# Patient Record
Sex: FEMALE | Race: WHITE | NOT HISPANIC OR LATINO | Employment: UNEMPLOYED | URBAN - METROPOLITAN AREA
[De-identification: names, ages, dates, MRNs, and addresses within clinical notes are randomized per-mention and may not be internally consistent; named-entity substitution may affect disease eponyms.]

---

## 2017-01-11 DIAGNOSIS — Z30.09 FAMILY PLANNING: ICD-10-CM

## 2017-01-11 RX ORDER — NORGESTIMATE AND ETHINYL ESTRADIOL 0.25-0.035
1 KIT ORAL DAILY
Qty: 28 TAB | Refills: 0 | Status: SHIPPED | OUTPATIENT
Start: 2017-01-11 | End: 2017-02-13

## 2017-01-11 NOTE — TELEPHONE ENCOUNTER
Was the patient seen in the last year in this department? Yes     Does patient have an active prescription for medications requested? No     Received Request Via: Patient     Patient rescheduled due to provider being out of office.

## 2017-01-16 ENCOUNTER — APPOINTMENT (OUTPATIENT)
Dept: MEDICAL GROUP | Age: 22
End: 2017-01-16
Payer: COMMERCIAL

## 2017-02-13 ENCOUNTER — HOSPITAL ENCOUNTER (OUTPATIENT)
Facility: MEDICAL CENTER | Age: 22
End: 2017-02-13
Attending: FAMILY MEDICINE
Payer: COMMERCIAL

## 2017-02-13 ENCOUNTER — OFFICE VISIT (OUTPATIENT)
Dept: MEDICAL GROUP | Age: 22
End: 2017-02-13
Payer: COMMERCIAL

## 2017-02-13 VITALS
SYSTOLIC BLOOD PRESSURE: 124 MMHG | DIASTOLIC BLOOD PRESSURE: 86 MMHG | HEIGHT: 66 IN | BODY MASS INDEX: 34.39 KG/M2 | TEMPERATURE: 97.9 F | OXYGEN SATURATION: 99 % | WEIGHT: 214 LBS | HEART RATE: 123 BPM

## 2017-02-13 DIAGNOSIS — Z11.3 ROUTINE SCREENING FOR STI (SEXUALLY TRANSMITTED INFECTION): ICD-10-CM

## 2017-02-13 DIAGNOSIS — E66.9 OBESITY (BMI 30-39.9): ICD-10-CM

## 2017-02-13 DIAGNOSIS — R11.0 POSTPRANDIAL NAUSEA: ICD-10-CM

## 2017-02-13 DIAGNOSIS — Z01.419 WELL FEMALE EXAM WITH ROUTINE GYNECOLOGICAL EXAM: Primary | ICD-10-CM

## 2017-02-13 DIAGNOSIS — N92.1 MENOMETRORRHAGIA: ICD-10-CM

## 2017-02-13 DIAGNOSIS — Z13.220 SCREENING, LIPID: ICD-10-CM

## 2017-02-13 DIAGNOSIS — Z13.0 SCREENING FOR DEFICIENCY ANEMIA: ICD-10-CM

## 2017-02-13 DIAGNOSIS — Z13.29 SCREENING FOR THYROID DISORDER: ICD-10-CM

## 2017-02-13 PROCEDURE — 87591 N.GONORRHOEAE DNA AMP PROB: CPT

## 2017-02-13 PROCEDURE — 99395 PREV VISIT EST AGE 18-39: CPT | Performed by: FAMILY MEDICINE

## 2017-02-13 PROCEDURE — 87491 CHLMYD TRACH DNA AMP PROBE: CPT

## 2017-02-13 PROCEDURE — 87510 GARDNER VAG DNA DIR PROBE: CPT

## 2017-02-13 PROCEDURE — 87480 CANDIDA DNA DIR PROBE: CPT

## 2017-02-13 PROCEDURE — 87660 TRICHOMONAS VAGIN DIR PROBE: CPT

## 2017-02-13 RX ORDER — ASCORBIC ACID 500 MG
500 TABLET ORAL DAILY
COMMUNITY

## 2017-02-13 RX ORDER — CHLORAL HYDRATE 500 MG
1000 CAPSULE ORAL
COMMUNITY

## 2017-02-13 RX ORDER — CHOLECALCIFEROL (VITAMIN D3) 125 MCG
500 CAPSULE ORAL DAILY
COMMUNITY

## 2017-02-13 ASSESSMENT — PATIENT HEALTH QUESTIONNAIRE - PHQ9: CLINICAL INTERPRETATION OF PHQ2 SCORE: 0

## 2017-02-13 NOTE — PROGRESS NOTES
"SUBJECTIVE: 21 y.o. female for annual routine gynecologic exam  Chief Complaint   Patient presents with   • Gynecologic Exam       Obstetric History       T0      TAB0   SAB0   E0   M0   L0       Last Pap: 2016   History   Sexual Activity   • Sexual Activity:   • Partners: Male     Sexual history: previously sexually active but currently abstaining, 1 partner since last year.   Has tried and failed multiple oral contraception pills: increase her bleeding or cramping or cause her skin to break out. Has decided she prefers condoms for now  H/O Abnormal Pap no  She  reports that she has never smoked. She has never used smokeless tobacco.        Allergies: Buspar and Sulfa drugs     ROS:    Menses every month with 6-7  Days bleeding 1 day  heavy bleeding   Cramping is moderate.   She does not take OTC analgesics for cramping  No significant bloating/fluid retention, pelvic pain, or dyspareunia. No vaginal discharge   No breast tenderness, mass, nipple discharge, changes in size or contour, or abnormal cyclic discomfort.  No urinary tract symptoms, no incontinence, no polydipsia, polyuria,  No abdominal pain, change in bowel habits, black or bloody stools.    No unusual headaches, no visual changes, has occasional menstrual migraines   No prolonged cough. No dyspnea or chest pain on exertion.  No current labile mood, anxiety, libido changes, insomnia/. Tends to have depression in winter - had family members death anniversary.   Chronic cold temperature intolerance. Reports tends to get nauseous and shaky if goes too long without eating or shortly after eating some foods.. No abdominal pain. No polydipsia, polyuria.   She is frustrated that she is gaining weight when she feels diet is \"healthy\" No new/concerning skin lesions, concerns. Acne cleared after stopping OCP    Exercise: not daily - some treadmill and weights/lunges not tracking calories. Feels diet is generally healthy. He eats a lot of " "smoothies  Preventive Care:current    Current medicines (including changes today)  Current Outpatient Prescriptions   Medication Sig Dispense Refill   • vitamin D (CHOLECALCIFEROL) 1000 UNIT Tab Take 1,000 Units by mouth every day.     • Pyridoxine HCl (VITAMIN B-6 PO) Take  by mouth.     • cyanocobalamin (VITAMIN B-12) 500 MCG Tab Take 500 mcg by mouth every day.     • ascorbic acid (ASCORBIC ACID) 500 MG Tab Take 500 mg by mouth every day.     • Calcium Carbonate-Vit D-Min (CALCIUM 1200 PO) Take  by mouth.     • Omega-3 Fatty Acids (FISH OIL) 1000 MG Cap capsule Take 1,000 mg by mouth 3 times a day, with meals.     • NIACIN CR PO Take  by mouth.     • B Complex Vitamins (B COMPLEX 1 PO) Take  by mouth.       No current facility-administered medications for this visit.     She  has a past medical history of CATARACT (11/7/2009); Menometrorrhagia (6/7/2010); Family planning (3/19/2011); Bleeding after intercourse (7/27/2011); and Personal history of seizure disorder.  She  has past surgical history that includes cataract phaco with iol; tonsillectomy and adenoidectomy (11/24/2009); and bunionectomy (7/30/2010).     Family History:   Family History   Problem Relation Age of Onset   • Cancer Other      GGM had breast cancer   • Thyroid Maternal Grandmother      Graves   • Thyroid Paternal Grandmother      Hypothyroid   • Psychiatry Maternal Grandmother      dx bipolar   • Genetic Paternal Grandmother      ALS       OBJECTIVE:   /86 mmHg  Pulse 123  Temp(Src) 36.6 °C (97.9 °F)  Ht 1.676 m (5' 5.98\")  Wt 97.07 kg (214 lb)  BMI 34.56 kg/m2  SpO2 99%  LMP 01/27/2017  Breastfeeding? No  Body mass index is 34.56 kg/(m^2).    HEAD AND NECK:  Ears normal.  Throat, oral cavity and tongue normal.  Neck supple. No adenopathy or masses in the neck or supraclavicular regions.  No carotid bruits. No thyromegaly. NEURO: Cranial nerves are normal. DTR's normal and symmetric.  CHEST:  Clear, good air entry, no wheezes or " rales. HEART:  Regular rate and rhythm.  S1 and S2 normal.   No edema or JVD. ABDOMEN:  Soft without tenderness, guarding, mass or organomegaly.  No CVA tenderness or inguinal adenopathy. EXTREMITIES:  Extremities, reflexes and peripheral pulses are normal. SKIN: color normal, vascularity normal, no edema, temperature normal   No rashes or suspicious skin lesions noted.     Breast Exam: Performed with instruction during examination. No axillary lymphadenopathy, no skin changes, no dominant masses. No nipple retraction  Pelvic Exam -  Normal external genitalia with no lesions. Vaginal Mucosa:  normal vaginal mucosa . Cervix with no visible lesions. No cervical motion tenderness. Uterus is normal sized with no masses. No adnexal tenderness or enlargement appreciated. Thin Prep Pap is not obtained, vaginal swab is obtained and specimen(s) sent to lab      <ASSESSMENT and PLAN>  1. Well female exam with routine gynecological exam     2. Menometrorrhagia  CBC WITH DIFFERENTIAL    Has adverse effects with all contraception as to date. Patient will use condoms.   3. Routine screening for STI (sexually transmitted infection)  HIV ANTIBODIES    CHLAMYDIA/GC PCR URINE OR SWAB    VAGINAL PATHOGENS DNA PANEL    We'll treat as indicated based on results   4. Screening for deficiency anemia  CBC WITH DIFFERENTIAL   5. Screening for thyroid disorder  TSH WITH REFLEX TO FT4   6. Screening, lipid  COMP METABOLIC PANEL    LIPID PROFILE   7. Postprandial nausea  INSULIN FASTING    Suspect reactive hypoglycemia. Discussed consistent carbs and higher protein and fiber intake with meals   8. Obesity (BMI 30-39.9)  Patient identified as having weight management issue.  Appropriate orders and counseling given.    Discussed diet, exercise, avoiding high caloric fluid intake       Discussed  breast self exam, family planning choices, adequate intake of calcium and vitamin D, diet and exercise   Follow-up in 1 years for next Gyn exam and 2  years for next Pap.   Next office visit for recheck of chronic medical conditions is due in  1 year unless abnormalities identified on labs

## 2017-02-13 NOTE — MR AVS SNAPSHOT
"nAika Wolfe   2017 9:50 AM   Office Visit   MRN: [id number]    Department:  [address] [place] [address]   Dept Phone:  [phone]    Description:  Female : 1995   Provider:  Roberta Ceron M.D.           Reason for Visit     Gynecologic Exam           Allergies as of 2017     Allergen Noted Reactions    Buspar [Buspirone] 01/15/2016       Shaky and vision change    Sulfa Drugs 2009         You were diagnosed with     Menometrorrhagia   [472885]       Routine screening for STI (sexually transmitted infection)   [801131]       Screening for deficiency anemia   [728061]       Screening for thyroid disorder   [V77.0.ICD-9-CM]       Screening, lipid   [318664]       Postprandial nausea   [117136]         Vital Signs     Blood Pressure Pulse Temperature Height Weight Body Mass Index    124/86 mmHg 123 36.6 °C (97.9 °F) 1.676 m (5' 5.98\") 97.07 kg (214 lb) 34.56 kg/m2    Oxygen Saturation Last Menstrual Period Breastfeeding? Smoking Status          99% 2017 No Never Smoker         Basic Information     Date Of Birth Sex Race Ethnicity Preferred Language    1995 Female White Non- English      Problem List              ICD-10-CM Priority Class Noted - Resolved    History of cataract surgery Z98.49   2009 - Present    Menometrorrhagia N92.1   2010 - Present    Family planning Z30.09   3/19/2011 - Present      Health Maintenance        Date Due Completion Dates    IMM INFLUENZA (1) 2016 ---    PAP SMEAR 1/15/2019 1/15/2016    IMM DTaP/Tdap/Td Vaccine (6 - Td) 2019, 1/10/2000, 1998, 9/10/1997, 1997            Current Immunizations     Dtap Vaccine 1/10/2000, 1998, 9/10/1997, 1997    HIB Vaccine (ACTHIB/HIBERIX) 1997    HPV Quadrivalent Vaccine (GARDASIL) 2012, 2011, 2007    Hepatitis A Vaccine, Ped/Adol 2009, 2005    Hepatitis B Vaccine Non-Recombivax (Ped/Adol) 2005, 2000, 1998   " MMR Vaccine 1/10/2000, 4/29/1997    Meningococcal Conjugate Vaccine MCV4 (Menactra) 9/13/2007    OPV - Historical Data 1/10/2000, 9/30/1998, 9/10/1997, 4/29/1997    Tdap Vaccine 11/11/2009    Varicella Vaccine Live 11/11/2009, 4/29/1997      Below and/or attached are the medications your provider expects you to take. Review all of your home medications and newly ordered medications with your provider and/or pharmacist. Follow medication instructions as directed by your provider and/or pharmacist. Please keep your medication list with you and share with your provider. Update the information when medications are discontinued, doses are changed, or new medications (including over-the-counter products) are added; and carry medication information at all times in the event of emergency situations     Allergies:  BUSPAR - (reactions not documented)     SULFA DRUGS - (reactions not documented)               Medications  Valid as of: February 13, 2017 - 10:38 AM    Generic Name Brand Name Tablet Size Instructions for use    Ascorbic Acid (Tab) ascorbic acid 500 MG Take 500 mg by mouth every day.        B Complex Vitamins   Take  by mouth.        Calcium Carbonate-Vit D-Min   Take  by mouth.        Cholecalciferol (Tab) cholecalciferol 1000 UNIT Take 1,000 Units by mouth every day.        Cyanocobalamin (Tab) VITAMIN B-12 500 MCG Take 500 mcg by mouth every day.        Niacin   Take  by mouth.        Omega-3 Fatty Acids (Cap) fish oil 1000 MG Take 1,000 mg by mouth 3 times a day, with meals.        Pyridoxine HCl   Take  by mouth.        .                 Medicines prescribed today were sent to:     [id number] - [place] - [address]    [address] [facility] [place] [address]    Phone: [phone] Fax: [phone]    Open 24 Hours?: No      Medication refill instructions:       If your prescription bottle indicates you have medication refills left, it is not necessary to call your provider’s office. Please  contact your pharmacy and they will refill your medication.    If your prescription bottle indicates you do not have any refills left, you may request refills at any time through one of the following ways: The online [facility] system (except Urgent Care), by calling your provider’s office, or by asking your pharmacy to contact your provider’s office with a refill request. Medication refills are processed only during regular business hours and may not be available until the next business day. Your provider may request additional information or to have a follow-up visit with you prior to refilling your medication.   *Please Note: Medication refills are assigned a new Rx number when refilled electronically. Your pharmacy may indicate that no refills were authorized even though a new prescription for the same medication is available at the pharmacy. Please request the medicine by name with the pharmacy before contacting your provider for a refill.        Your To Do List     Future Labs/Procedures Complete By Expires    CBC WITH DIFFERENTIAL  As directed 2/14/2018    CHLAMYDIA/GC PCR URINE OR SWAB  As directed 2/14/2018    COMP METABOLIC PANEL  As directed 2/14/2018    HIV ANTIBODIES  As directed 2/14/2018    INSULIN FASTING  As directed 2/13/2018    LIPID PROFILE  As directed 2/14/2018    TSH WITH REFLEX TO FT4  As directed 2/13/2018    VAGINAL PATHOGENS DNA PANEL  As directed 2/14/2018         [facility] Access Code: Activation code not generated  Current [facility] Status: Active

## 2017-02-14 ENCOUNTER — HOSPITAL ENCOUNTER (OUTPATIENT)
Dept: LAB | Facility: MEDICAL CENTER | Age: 22
End: 2017-02-14
Attending: FAMILY MEDICINE
Payer: COMMERCIAL

## 2017-02-14 DIAGNOSIS — Z11.3 ROUTINE SCREENING FOR STI (SEXUALLY TRANSMITTED INFECTION): ICD-10-CM

## 2017-02-14 DIAGNOSIS — N92.1 MENOMETRORRHAGIA: ICD-10-CM

## 2017-02-14 DIAGNOSIS — Z13.0 SCREENING FOR DEFICIENCY ANEMIA: ICD-10-CM

## 2017-02-14 DIAGNOSIS — Z13.220 SCREENING, LIPID: ICD-10-CM

## 2017-02-14 DIAGNOSIS — R11.0 POSTPRANDIAL NAUSEA: ICD-10-CM

## 2017-02-14 DIAGNOSIS — Z13.29 SCREENING FOR THYROID DISORDER: ICD-10-CM

## 2017-02-14 LAB
ALBUMIN SERPL BCP-MCNC: 4.1 G/DL (ref 3.2–4.9)
ALBUMIN/GLOB SERPL: 1.4 G/DL
ALP SERPL-CCNC: 54 U/L (ref 30–99)
ALT SERPL-CCNC: 14 U/L (ref 2–50)
ANION GAP SERPL CALC-SCNC: 6 MMOL/L (ref 0–11.9)
AST SERPL-CCNC: 17 U/L (ref 12–45)
BASOPHILS # BLD AUTO: 0.04 K/UL (ref 0–0.12)
BASOPHILS NFR BLD AUTO: 0.6 % (ref 0–1.8)
BILIRUB SERPL-MCNC: 0.5 MG/DL (ref 0.1–1.5)
BUN SERPL-MCNC: 11 MG/DL (ref 8–22)
C TRACH DNA GENITAL QL NAA+PROBE: NEGATIVE
CALCIUM SERPL-MCNC: 9.7 MG/DL (ref 8.5–10.5)
CANDIDA DNA VAG QL PROBE+SIG AMP: NEGATIVE
CHLORIDE SERPL-SCNC: 109 MMOL/L (ref 96–112)
CHOLEST SERPL-MCNC: 145 MG/DL (ref 100–199)
CO2 SERPL-SCNC: 26 MMOL/L (ref 20–33)
CREAT SERPL-MCNC: 0.81 MG/DL (ref 0.5–1.4)
EOSINOPHIL # BLD: 0.1 K/UL (ref 0–0.51)
EOSINOPHIL NFR BLD AUTO: 1.5 % (ref 0–6.9)
ERYTHROCYTE [DISTWIDTH] IN BLOOD BY AUTOMATED COUNT: 36.5 FL (ref 35.9–50)
G VAGINALIS DNA VAG QL PROBE+SIG AMP: NEGATIVE
GLOBULIN SER CALC-MCNC: 2.9 G/DL (ref 1.9–3.5)
GLUCOSE SERPL-MCNC: 84 MG/DL (ref 65–99)
HCT VFR BLD AUTO: 44.2 % (ref 37–47)
HDLC SERPL-MCNC: 64 MG/DL
HGB BLD-MCNC: 14.9 G/DL (ref 12–16)
HIV 1+2 AB+HIV1 P24 AG SERPL QL IA: NON REACTIVE
IMM GRANULOCYTES # BLD AUTO: 0.01 K/UL (ref 0–0.11)
IMM GRANULOCYTES NFR BLD AUTO: 0.1 % (ref 0–0.9)
LDLC SERPL CALC-MCNC: 67 MG/DL
LYMPHOCYTES # BLD: 2.65 K/UL (ref 1–4.8)
LYMPHOCYTES NFR BLD AUTO: 39.7 % (ref 22–41)
MCH RBC QN AUTO: 28.1 PG (ref 27–33)
MCHC RBC AUTO-ENTMCNC: 33.7 G/DL (ref 33.6–35)
MCV RBC AUTO: 83.4 FL (ref 81.4–97.8)
MONOCYTES # BLD: 0.41 K/UL (ref 0–0.85)
MONOCYTES NFR BLD AUTO: 6.1 % (ref 0–13.4)
N GONORRHOEA DNA GENITAL QL NAA+PROBE: NEGATIVE
NEUTROPHILS # BLD: 3.46 K/UL (ref 2–7.15)
NEUTROPHILS NFR BLD AUTO: 52 % (ref 44–72)
NRBC # BLD AUTO: 0 K/UL
NRBC BLD-RTO: 0 /100 WBC
PLATELET # BLD AUTO: 301 K/UL (ref 164–446)
PMV BLD AUTO: 9.6 FL (ref 9–12.9)
POTASSIUM SERPL-SCNC: 4 MMOL/L (ref 3.6–5.5)
PROT SERPL-MCNC: 7 G/DL (ref 6–8.2)
RBC # BLD AUTO: 5.3 M/UL (ref 4.2–5.4)
SODIUM SERPL-SCNC: 141 MMOL/L (ref 135–145)
SPECIMEN SOURCE: NORMAL
T VAGINALIS DNA VAG QL PROBE+SIG AMP: NEGATIVE
TRIGL SERPL-MCNC: 72 MG/DL (ref 0–149)
TSH SERPL DL<=0.005 MIU/L-ACNC: 1.38 UIU/ML (ref 0.3–3.7)
WBC # BLD AUTO: 6.7 K/UL (ref 4.8–10.8)

## 2017-02-14 PROCEDURE — 80053 COMPREHEN METABOLIC PANEL: CPT

## 2017-02-14 PROCEDURE — 85025 COMPLETE CBC W/AUTO DIFF WBC: CPT

## 2017-02-14 PROCEDURE — 80061 LIPID PANEL: CPT

## 2017-02-14 PROCEDURE — 36415 COLL VENOUS BLD VENIPUNCTURE: CPT

## 2017-02-14 PROCEDURE — 87389 HIV-1 AG W/HIV-1&-2 AB AG IA: CPT

## 2017-02-14 PROCEDURE — 83525 ASSAY OF INSULIN: CPT

## 2017-02-14 PROCEDURE — 84443 ASSAY THYROID STIM HORMONE: CPT

## 2017-02-16 LAB — INSULIN P FAST SERPL-ACNC: 15 UIU/ML (ref 3–19)

## 2017-03-02 ENCOUNTER — PATIENT MESSAGE (OUTPATIENT)
Dept: MEDICAL GROUP | Age: 22
End: 2017-03-02

## 2017-05-19 ENCOUNTER — TELEPHONE (OUTPATIENT)
Dept: MEDICAL GROUP | Facility: PHYSICIAN GROUP | Age: 22
End: 2017-05-19

## 2017-05-19 NOTE — TELEPHONE ENCOUNTER
"1. Caller Name: Anika Wolfe                                         Call Back Number: [phone] (home)       Patient approves a detailed voicemail message: yes    Pt called stating the Fasting Insulin lab she had done in February is not being covered by her insurance and she is getting billed almost 600$. Insurance is telling her that the code needs to be changed. The current code used was for\"Postprandia Nausea\" which is not covering it. Is there any other code that can be used?    "

## 2017-05-19 NOTE — TELEPHONE ENCOUNTER
"Yes, that was the only test not covered. I read your office note when she called and I explained the same thing to the patient but she wanted to ask you anyway. She says she has a family history of hypoglycemia and that's why it was being tested. I explained that it was not a \"routine test\" but testing for something specific, hence the diagnosis.  "

## 2017-05-19 NOTE — TELEPHONE ENCOUNTER
"I am sorry.  The fasting insulin test was not a screening lab. It was ordered to evaluate her specific symptom of nausea so I can not now say it was \"screening\" or \"routine.\".  I am not seeing any other diagnosis in my documentation that can be used.  Was the insulin test the only test not covered?  Roberta Ceron MD      "

## 2022-09-28 ENCOUNTER — HOSPITAL ENCOUNTER (OUTPATIENT)
Dept: LAB | Facility: MEDICAL CENTER | Age: 27
End: 2022-09-28
Attending: INTERNAL MEDICINE
Payer: MEDICAID

## 2022-09-28 LAB
T3 SERPL-MCNC: 108 NG/DL (ref 60–181)
T4 FREE SERPL-MCNC: 0.9 NG/DL (ref 0.93–1.7)
THYROPEROXIDASE AB SERPL-ACNC: 13 IU/ML (ref 0–9)
TSH SERPL DL<=0.005 MIU/L-ACNC: 1.46 UIU/ML (ref 0.38–5.33)

## 2022-09-28 PROCEDURE — 84443 ASSAY THYROID STIM HORMONE: CPT

## 2022-09-28 PROCEDURE — 84480 ASSAY TRIIODOTHYRONINE (T3): CPT

## 2022-09-28 PROCEDURE — 84445 ASSAY OF TSI GLOBULIN: CPT

## 2022-09-28 PROCEDURE — 86376 MICROSOMAL ANTIBODY EACH: CPT

## 2022-09-28 PROCEDURE — 84439 ASSAY OF FREE THYROXINE: CPT

## 2022-09-28 PROCEDURE — 36415 COLL VENOUS BLD VENIPUNCTURE: CPT

## 2022-09-30 ENCOUNTER — HOSPITAL ENCOUNTER (OUTPATIENT)
Dept: LAB | Facility: MEDICAL CENTER | Age: 27
End: 2022-09-30
Attending: NURSE PRACTITIONER
Payer: MEDICAID

## 2022-09-30 LAB — CORTIS SERPL-MCNC: 0.5 UG/DL (ref 0–23)

## 2022-09-30 PROCEDURE — 82533 TOTAL CORTISOL: CPT

## 2022-09-30 PROCEDURE — 36415 COLL VENOUS BLD VENIPUNCTURE: CPT

## 2022-10-04 LAB — TSI SER-ACNC: <0.1 IU/L

## 2023-06-29 ENCOUNTER — RESEARCH ENCOUNTER (OUTPATIENT)
Dept: RESEARCH | Facility: WORKSITE | Age: 28
End: 2023-06-29
Payer: MEDICAID

## 2023-06-29 NOTE — RESEARCH NOTE
This patient joined the [facility] from home and had a genetic finding returned on 6/15/2023. Results uploaded to patient's chart, genomic indicators updated, return of results provided, genetic counseling initiated.